# Patient Record
Sex: FEMALE | Race: BLACK OR AFRICAN AMERICAN | Employment: UNEMPLOYED | URBAN - METROPOLITAN AREA
[De-identification: names, ages, dates, MRNs, and addresses within clinical notes are randomized per-mention and may not be internally consistent; named-entity substitution may affect disease eponyms.]

---

## 2017-02-11 ENCOUNTER — APPOINTMENT (OUTPATIENT)
Dept: GENERAL RADIOLOGY | Facility: HOSPITAL | Age: 52
End: 2017-02-11
Attending: EMERGENCY MEDICINE
Payer: MEDICAID

## 2017-02-11 ENCOUNTER — HOSPITAL ENCOUNTER (EMERGENCY)
Facility: HOSPITAL | Age: 52
Discharge: HOME | End: 2017-02-11
Attending: EMERGENCY MEDICINE
Payer: MEDICAID

## 2017-02-11 VITALS
SYSTOLIC BLOOD PRESSURE: 103 MMHG | TEMPERATURE: 99 F | DIASTOLIC BLOOD PRESSURE: 66 MMHG | OXYGEN SATURATION: 100 % | WEIGHT: 169 LBS | HEART RATE: 74 BPM | HEIGHT: 62 IN | RESPIRATION RATE: 16 BRPM | BODY MASS INDEX: 31.1 KG/M2

## 2017-02-11 DIAGNOSIS — M54.50 LOW BACK PAIN AT MULTIPLE SITES: Primary | ICD-10-CM

## 2017-02-11 LAB
B-HCG UR QL: NEGATIVE
BILIRUB UR QL: NEGATIVE
CLARITY UR: CLEAR
COLOR UR: YELLOW
GLUCOSE UR-MCNC: NEGATIVE MG/DL
KETONES UR-MCNC: NEGATIVE MG/DL
LEUKOCYTE ESTERASE UR QL STRIP.AUTO: NEGATIVE
NITRITE UR QL STRIP.AUTO: NEGATIVE
PH UR: 5 [PH] (ref 5–8)
PROT UR-MCNC: NEGATIVE MG/DL
RBC #/AREA URNS AUTO: 3 /HPF
SP GR UR STRIP: 1.01 (ref 1–1.03)
UROBILINOGEN UR STRIP-ACNC: <2
VIT C UR-MCNC: NEGATIVE MG/DL
WBC #/AREA URNS AUTO: 2 /HPF

## 2017-02-11 PROCEDURE — 71020 XR CHEST PA + LAT CHEST (CPT=71020): CPT

## 2017-02-11 PROCEDURE — 72100 X-RAY EXAM L-S SPINE 2/3 VWS: CPT

## 2017-02-11 PROCEDURE — 81025 URINE PREGNANCY TEST: CPT

## 2017-02-11 PROCEDURE — 99284 EMERGENCY DEPT VISIT MOD MDM: CPT

## 2017-02-11 PROCEDURE — 81001 URINALYSIS AUTO W/SCOPE: CPT | Performed by: EMERGENCY MEDICINE

## 2017-02-11 RX ORDER — IBUPROFEN 400 MG/1
400 TABLET ORAL EVERY 8 HOURS PRN
Qty: 20 TABLET | Refills: 0 | Status: SHIPPED | OUTPATIENT
Start: 2017-02-11 | End: 2017-02-16

## 2017-02-11 RX ORDER — IBUPROFEN 600 MG/1
600 TABLET ORAL ONCE
Status: COMPLETED | OUTPATIENT
Start: 2017-02-11 | End: 2017-02-11

## 2017-02-11 NOTE — ED PROVIDER NOTES
Patient Seen in: [facility] Emergency Department    History   Patient presents with:  Abdomen/Flank Pain (GI/)    Stated Complaint: c/o left sided flank pain radiating to left shoulder    HPI    Patient is a 16-year-old female that is a very poo and time. Appears well-developed and well-nourished. HEENT:   Head: Normocephalic and atraumatic.    Right Ear: External ear normal.   Left Ear: External ear normal.   Nose: Nose normal.   Mouth/Throat: Oropharynx is clear and moist.   Eyes: Conjunctivae spondylolysis.  Mild chronic degenerative changes lumbar spine             Disposition and Plan     Clinical Impression:  Low back pain at multiple sites  (primary encounter diagnosis)    Disposition:  Discharge    Follow-up:  No follow-up provider specifie

## 2017-06-09 ENCOUNTER — APPOINTMENT (OUTPATIENT)
Dept: GENERAL RADIOLOGY | Facility: HOSPITAL | Age: 52
End: 2017-06-09
Attending: NURSE PRACTITIONER
Payer: MEDICAID

## 2017-06-09 ENCOUNTER — HOSPITAL ENCOUNTER (EMERGENCY)
Facility: HOSPITAL | Age: 52
Discharge: HOME | End: 2017-06-09
Payer: MEDICAID

## 2017-06-09 VITALS
BODY MASS INDEX: 33.13 KG/M2 | SYSTOLIC BLOOD PRESSURE: 141 MMHG | HEART RATE: 66 BPM | TEMPERATURE: 99 F | WEIGHT: 180 LBS | OXYGEN SATURATION: 100 % | HEIGHT: 62 IN | DIASTOLIC BLOOD PRESSURE: 82 MMHG | RESPIRATION RATE: 16 BRPM

## 2017-06-09 DIAGNOSIS — S76.912A MUSCLE STRAIN OF LEFT THIGH, INITIAL ENCOUNTER: Primary | ICD-10-CM

## 2017-06-09 PROCEDURE — 99283 EMERGENCY DEPT VISIT LOW MDM: CPT

## 2017-06-09 PROCEDURE — 96372 THER/PROPH/DIAG INJ SC/IM: CPT

## 2017-06-09 PROCEDURE — 73552 X-RAY EXAM OF FEMUR 2/>: CPT | Performed by: NURSE PRACTITIONER

## 2017-06-09 RX ORDER — IBUPROFEN 600 MG/1
600 TABLET ORAL EVERY 8 HOURS PRN
Status: DISCONTINUED | OUTPATIENT
Start: 2017-06-09 | End: 2017-06-10

## 2017-06-09 RX ORDER — IBUPROFEN 600 MG/1
600 TABLET ORAL EVERY 8 HOURS PRN
Qty: 20 TABLET | Refills: 0 | Status: ON HOLD | OUTPATIENT
Start: 2017-06-09 | End: 2019-11-11

## 2017-06-09 RX ORDER — KETOROLAC TROMETHAMINE 30 MG/ML
60 INJECTION, SOLUTION INTRAMUSCULAR; INTRAVENOUS ONCE
Status: COMPLETED | OUTPATIENT
Start: 2017-06-09 | End: 2017-06-09

## 2017-06-09 RX ORDER — CYCLOBENZAPRINE HCL 10 MG
10 TABLET ORAL 3 TIMES DAILY PRN
Qty: 20 TABLET | Refills: 0 | Status: SHIPPED | OUTPATIENT
Start: 2017-06-09 | End: 2017-06-16

## 2017-06-10 NOTE — ED NOTES
Patient sleeping on cart- vitals WNL, updated on plan of care. At this time patient states her pain is 8/10. APN made aware. ,

## 2017-06-10 NOTE — ED PROVIDER NOTES
Patient Seen in: [facility] AND [facility] Emergency Department    History   Patient presents with:  Fall (musculoskeletal, neurologic)    Stated Complaint: fall    HPI    Patient complains of left thigh pain after running to  a baseball while playing wit bilateral  CARDIO: RRR without murmur  GI: abdomen is soft and non tender, no masses, nl bowel sounds   EXTREMITIES: from, 5/5 strength in all 4 ext, no edema   NEURO: alert and oiented *3, 2-12 intact, no focal deficit noted  SKIN: good skin turgor, no  r

## 2018-04-18 ENCOUNTER — HOSPITAL ENCOUNTER (EMERGENCY)
Facility: HOSPITAL | Age: 53
Discharge: HOME | End: 2018-04-18
Attending: EMERGENCY MEDICINE
Payer: MEDICAID

## 2018-04-18 VITALS
HEART RATE: 66 BPM | DIASTOLIC BLOOD PRESSURE: 75 MMHG | RESPIRATION RATE: 14 BRPM | TEMPERATURE: 98 F | HEIGHT: 62 IN | BODY MASS INDEX: 32.2 KG/M2 | WEIGHT: 175 LBS | SYSTOLIC BLOOD PRESSURE: 125 MMHG | OXYGEN SATURATION: 99 %

## 2018-04-18 DIAGNOSIS — K52.9 GASTROENTERITIS: Primary | ICD-10-CM

## 2018-04-18 PROCEDURE — 96360 HYDRATION IV INFUSION INIT: CPT

## 2018-04-18 PROCEDURE — 93010 ELECTROCARDIOGRAM REPORT: CPT | Performed by: EMERGENCY MEDICINE

## 2018-04-18 PROCEDURE — 80048 BASIC METABOLIC PNL TOTAL CA: CPT | Performed by: EMERGENCY MEDICINE

## 2018-04-18 PROCEDURE — 80076 HEPATIC FUNCTION PANEL: CPT | Performed by: EMERGENCY MEDICINE

## 2018-04-18 PROCEDURE — 99284 EMERGENCY DEPT VISIT MOD MDM: CPT

## 2018-04-18 PROCEDURE — 93005 ELECTROCARDIOGRAM TRACING: CPT

## 2018-04-18 PROCEDURE — 85025 COMPLETE CBC W/AUTO DIFF WBC: CPT | Performed by: EMERGENCY MEDICINE

## 2018-04-18 PROCEDURE — 81001 URINALYSIS AUTO W/SCOPE: CPT | Performed by: EMERGENCY MEDICINE

## 2018-04-18 PROCEDURE — 83690 ASSAY OF LIPASE: CPT | Performed by: EMERGENCY MEDICINE

## 2018-04-18 RX ORDER — ONDANSETRON 4 MG/1
4 TABLET, ORALLY DISINTEGRATING ORAL EVERY 6 HOURS PRN
Qty: 10 TABLET | Refills: 0 | Status: SHIPPED | OUTPATIENT
Start: 2018-04-18 | End: 2018-04-25

## 2018-04-18 RX ORDER — LEVOTHYROXINE SODIUM 0.15 MG/1
150 TABLET ORAL
Status: ON HOLD | COMMUNITY
End: 2019-11-12

## 2018-04-18 RX ORDER — ASPIRIN 325 MG
325 TABLET ORAL DAILY
COMMUNITY

## 2018-04-18 NOTE — ED PROVIDER NOTES
Patient Seen in: [facility] Emergency Department    History   Patient presents with:  Abdomen/Flank Pain (GI/)    Stated Complaint: abd pain and vomit     HPI    46 yo female with  Abdominal pain for the last 12 hours.  She did vomit, no diarrhea adenopathy. Neurological: She is alert and oriented to person, place, and time. No cranial nerve deficit. Skin: Skin is warm and dry. Psychiatric: She has a normal mood and affect. Her behavior is normal.   Nursing note and vitals reviewed. Medication List    START taking these medications    ondansetron 4 MG Oral Tablet Dispersible  Take 1 tablet (4 mg total) by mouth every 6 (six) hours as needed for Nausea.   Qty: 10 tablet Refills: 0

## 2018-05-23 ENCOUNTER — HOSPITAL ENCOUNTER (EMERGENCY)
Facility: HOSPITAL | Age: 53
Discharge: HOME | End: 2018-05-23
Attending: EMERGENCY MEDICINE
Payer: MEDICAID

## 2018-05-23 ENCOUNTER — APPOINTMENT (OUTPATIENT)
Dept: GENERAL RADIOLOGY | Facility: HOSPITAL | Age: 53
End: 2018-05-23
Attending: EMERGENCY MEDICINE
Payer: MEDICAID

## 2018-05-23 VITALS
RESPIRATION RATE: 18 BRPM | SYSTOLIC BLOOD PRESSURE: 132 MMHG | WEIGHT: 160 LBS | TEMPERATURE: 98 F | DIASTOLIC BLOOD PRESSURE: 60 MMHG | HEIGHT: 62 IN | OXYGEN SATURATION: 100 % | BODY MASS INDEX: 29.44 KG/M2 | HEART RATE: 68 BPM

## 2018-05-23 DIAGNOSIS — D22.9 BENIGN MOLE: ICD-10-CM

## 2018-05-23 DIAGNOSIS — S46.912A SHOULDER STRAIN, LEFT, INITIAL ENCOUNTER: Primary | ICD-10-CM

## 2018-05-23 PROCEDURE — 85025 COMPLETE CBC W/AUTO DIFF WBC: CPT | Performed by: EMERGENCY MEDICINE

## 2018-05-23 PROCEDURE — 71046 X-RAY EXAM CHEST 2 VIEWS: CPT | Performed by: EMERGENCY MEDICINE

## 2018-05-23 PROCEDURE — 93005 ELECTROCARDIOGRAM TRACING: CPT

## 2018-05-23 PROCEDURE — 99285 EMERGENCY DEPT VISIT HI MDM: CPT

## 2018-05-23 PROCEDURE — 93010 ELECTROCARDIOGRAM REPORT: CPT | Performed by: EMERGENCY MEDICINE

## 2018-05-23 PROCEDURE — 36415 COLL VENOUS BLD VENIPUNCTURE: CPT

## 2018-05-23 PROCEDURE — 80048 BASIC METABOLIC PNL TOTAL CA: CPT | Performed by: EMERGENCY MEDICINE

## 2018-05-23 PROCEDURE — 84484 ASSAY OF TROPONIN QUANT: CPT | Performed by: EMERGENCY MEDICINE

## 2018-05-23 RX ORDER — MELOXICAM 15 MG/1
15 TABLET ORAL DAILY
Qty: 15 TABLET | Refills: 0 | Status: SHIPPED | OUTPATIENT
Start: 2018-05-23 | End: 2018-06-07

## 2018-05-23 NOTE — ED PROVIDER NOTES
Patient Seen in: [facility] Emergency Department    History   No chief complaint on file. HPI    Patient presents to the ED complaining of left shoulder pain for the past several days.   She denies known injury, states pain is moderate, worse Pulmonary/Chest: Effort normal and breath sounds normal. No stridor. No respiratory distress. She has no wheezes. Abdominal: Soft. She exhibits no distension. Musculoskeletal: She exhibits tenderness. She exhibits no edema or deformity.    Tenderness in ED:    Preliminary Radiology Report  [facility], [facility]  [phone] - [address]    NAME: Nu Roth OF EXAM: 05/23/2018  Patient No:  [id number]  Physician:  Cyrstal Schreiber  YOB: 1965    Past mole    Disposition:  Discharge    Follow-up:  Benedict Aguilar   Rehab [address]    Schedule an appointment as soon as possible for a visit in 3 days      [address] [address]

## 2018-07-10 ENCOUNTER — HOSPITAL ENCOUNTER (EMERGENCY)
Facility: HOSPITAL | Age: 53
Discharge: HOME | End: 2018-07-10
Attending: EMERGENCY MEDICINE
Payer: COMMERCIAL

## 2018-07-10 VITALS
BODY MASS INDEX: 29.45 KG/M2 | TEMPERATURE: 98 F | DIASTOLIC BLOOD PRESSURE: 74 MMHG | OXYGEN SATURATION: 100 % | RESPIRATION RATE: 14 BRPM | HEART RATE: 50 BPM | HEIGHT: 62 IN | SYSTOLIC BLOOD PRESSURE: 129 MMHG | WEIGHT: 160.06 LBS

## 2018-07-10 DIAGNOSIS — S39.012A BACK STRAIN, INITIAL ENCOUNTER: Primary | ICD-10-CM

## 2018-07-10 PROCEDURE — 99282 EMERGENCY DEPT VISIT SF MDM: CPT
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
## 2019-11-11 ENCOUNTER — APPOINTMENT (OUTPATIENT)
Dept: CT IMAGING | Facility: HOSPITAL | Age: 54
End: 2019-11-11
Attending: EMERGENCY MEDICINE

## 2019-11-11 ENCOUNTER — APPOINTMENT (OUTPATIENT)
Dept: MRI IMAGING | Facility: HOSPITAL | Age: 54
End: 2019-11-11
Attending: Other

## 2019-11-11 ENCOUNTER — APPOINTMENT (OUTPATIENT)
Dept: CV DIAGNOSTICS | Facility: HOSPITAL | Age: 54
End: 2019-11-11
Attending: Other

## 2019-11-11 ENCOUNTER — HOSPITAL ENCOUNTER (OUTPATIENT)
Facility: HOSPITAL | Age: 54
Setting detail: OBSERVATION
Discharge: HOME | End: 2019-11-12
Attending: EMERGENCY MEDICINE | Admitting: HOSPITALIST

## 2019-11-11 DIAGNOSIS — R51.9 NONINTRACTABLE HEADACHE, UNSPECIFIED CHRONICITY PATTERN, UNSPECIFIED HEADACHE TYPE: ICD-10-CM

## 2019-11-11 DIAGNOSIS — R07.89 CHEST PAIN, ATYPICAL: ICD-10-CM

## 2019-11-11 DIAGNOSIS — R20.2 PARESTHESIA OF LEFT LEG: Primary | ICD-10-CM

## 2019-11-11 PROCEDURE — 93306 TTE W/DOPPLER COMPLETE: CPT | Performed by: OTHER

## 2019-11-11 PROCEDURE — 70450 CT HEAD/BRAIN W/O DYE: CPT | Performed by: EMERGENCY MEDICINE

## 2019-11-11 PROCEDURE — 99244 OFF/OP CNSLTJ NEW/EST MOD 40: CPT | Performed by: OTHER

## 2019-11-11 PROCEDURE — 71275 CT ANGIOGRAPHY CHEST: CPT | Performed by: EMERGENCY MEDICINE

## 2019-11-11 PROCEDURE — 70496 CT ANGIOGRAPHY HEAD: CPT | Performed by: EMERGENCY MEDICINE

## 2019-11-11 PROCEDURE — 70551 MRI BRAIN STEM W/O DYE: CPT | Performed by: OTHER

## 2019-11-11 PROCEDURE — 70498 CT ANGIOGRAPHY NECK: CPT | Performed by: EMERGENCY MEDICINE

## 2019-11-11 RX ORDER — MAGNESIUM HYDROXIDE/ALUMINUM HYDROXICE/SIMETHICONE 120; 1200; 1200 MG/30ML; MG/30ML; MG/30ML
30 SUSPENSION ORAL 4 TIMES DAILY PRN
Status: DISCONTINUED | OUTPATIENT
Start: 2019-11-11 | End: 2019-11-12

## 2019-11-11 RX ORDER — ASPIRIN 325 MG
325 TABLET ORAL DAILY
Status: DISCONTINUED | OUTPATIENT
Start: 2019-11-12 | End: 2019-11-12

## 2019-11-11 RX ORDER — LEVOTHYROXINE SODIUM 0.15 MG/1
150 TABLET ORAL
Status: DISCONTINUED | OUTPATIENT
Start: 2019-11-11 | End: 2019-11-12

## 2019-11-11 RX ORDER — ASPIRIN 325 MG
325 TABLET ORAL DAILY
Status: DISCONTINUED | OUTPATIENT
Start: 2019-11-11 | End: 2019-11-11

## 2019-11-11 RX ORDER — BUTALBITAL, ASPIRIN, AND CAFFEINE 50; 325; 40 MG/1; MG/1; MG/1
1 CAPSULE ORAL EVERY 4 HOURS PRN
Status: DISCONTINUED | OUTPATIENT
Start: 2019-11-11 | End: 2019-11-11

## 2019-11-11 RX ORDER — KETOROLAC TROMETHAMINE 30 MG/ML
15 INJECTION, SOLUTION INTRAMUSCULAR; INTRAVENOUS EVERY 6 HOURS PRN
Status: DISCONTINUED | OUTPATIENT
Start: 2019-11-11 | End: 2019-11-12

## 2019-11-11 RX ORDER — BUTALBITAL, ACETAMINOPHEN AND CAFFEINE 50; 325; 40 MG/1; MG/1; MG/1
1 TABLET ORAL EVERY 4 HOURS PRN
Status: DISCONTINUED | OUTPATIENT
Start: 2019-11-11 | End: 2019-11-12

## 2019-11-11 RX ORDER — TRAMADOL HYDROCHLORIDE 50 MG/1
50 TABLET ORAL EVERY 6 HOURS PRN
Status: DISCONTINUED | OUTPATIENT
Start: 2019-11-11 | End: 2019-11-11

## 2019-11-11 RX ORDER — HEPARIN SODIUM 5000 [USP'U]/ML
5000 INJECTION, SOLUTION INTRAVENOUS; SUBCUTANEOUS EVERY 8 HOURS SCHEDULED
Status: DISCONTINUED | OUTPATIENT
Start: 2019-11-11 | End: 2019-11-12

## 2019-11-11 RX ORDER — ONDANSETRON 2 MG/ML
4 INJECTION INTRAMUSCULAR; INTRAVENOUS EVERY 6 HOURS PRN
Status: DISCONTINUED | OUTPATIENT
Start: 2019-11-11 | End: 2019-11-12

## 2019-11-11 RX ORDER — CYCLOBENZAPRINE HCL 5 MG
10 TABLET ORAL 3 TIMES DAILY PRN
Status: DISCONTINUED | OUTPATIENT
Start: 2019-11-11 | End: 2019-11-12

## 2019-11-11 RX ORDER — ACETAMINOPHEN 325 MG/1
650 TABLET ORAL EVERY 6 HOURS PRN
Status: DISCONTINUED | OUTPATIENT
Start: 2019-11-11 | End: 2019-11-12

## 2019-11-11 RX ORDER — ASPIRIN 300 MG
300 SUPPOSITORY, RECTAL RECTAL DAILY
Status: DISCONTINUED | OUTPATIENT
Start: 2019-11-11 | End: 2019-11-11

## 2019-11-11 NOTE — PHYSICAL THERAPY NOTE
PHYSICAL THERAPY EVALUATION - INPATIENT     Room Number: 497/125-Q  Evaluation Date: 11/11/2019  Type of Evaluation: Initial   Physician Order: PT Eval and Treat    Presenting Problem: L leg paresthesia, headache, chest pain  Reason for Therapy: Mobility w/ horizontal and vertical head turns without affecting her balance. The patient's Approx Degree of Impairment: 35.83% has been calculated based on documentation in the [facility] '6 clicks' Inpatient Basic Mobility Short Form.   Research supports that vane pattern, unspecified headache type      Past Medical History  Past Medical History:   Diagnosis Date   • Anemia    • Stroke [facility])    • Thyroid disease        Past Surgical History  No past surgical history on file.     HOME SITUATION  Type of Home: House etc.): A Little   -   Moving from lying on back to sitting on the side of the bed?: None   How much help from another person does the patient currently need. ..   -   Moving to and from a bed to a chair (including a wheelchair)?: A Little   -   Need to walk

## 2019-11-11 NOTE — ED PROVIDER NOTES
Patient Seen in: [facility] AND [facility] Emergency Department    History   No chief complaint on file.     Stated Complaint: headache and left sided weakness     HPI    64-year-old female with past medical history of CVA without residual deficits, hypothyroid pre 100 %   O2 Device 11/11/19 0326 None (Room air)       Current:/82 (BP Location: Left arm)   Pulse 60   Temp 97.5 °F (36.4 °C) (Oral)   Resp 20   Ht 157.5 cm (5' 2\")   Wt 78.2 kg   SpO2 99%   BMI 31.55 kg/m²         Physical Exam   Constitutional: No Radiology, [facility] [address]  [phone] - Phone    [id number]    NAME: Jose Roberto Maurice OF EXAM: 11/11/2019  Patient No:  [id number]  Physician:  Yazmin Jackson  YOB: 1965    Past Medical History (entered by Technologist):    Re edema  No pleural effusion or pneumothorax    Enlarged heterogeneous right lobe of thyroid which is nonspecific      Case discussed with  Dr. Adrian Haq at 3:05 Cas Kong M.D.   This report has been electronically signed and verified by the Radiolog

## 2019-11-11 NOTE — PLAN OF CARE
Pt received awake and alert but sleepy. Continues to c/o headache and lower back pain. Q4H neuro checks. Echo done. V/S WNL. To be seen by PT/OT/ST. Encouraged to call for assistance.      Problem: SAFETY ADULT - FALL  Goal: Free from fall injury  Descripti

## 2019-11-11 NOTE — SLP NOTE
Received stroke protocol orders. Attempted to see patient, however, she was off floor for ECHO. Will attempt later today or tomorrow.   Nidhi Swartz MA/[facility]-SLP  Speech Language Pathologist  [place]. [phone]

## 2019-11-11 NOTE — CONSULTS
Neurology Inpatient Consult Note    Laurie Davalos : 1965   Referring Physician: Dr. Suellen Buckner  HPI:     Laurie Davalos is a 47year old female who is being seen in neurologic evaluation for headache, left-sided numbness.     Patient presented auscultation bilaterally  Neuro:  Mental Status: alert, speech fluent and appropriate       Cranial Nerves: pupils equal, round, and reactive to light; extraocular movements intact; facial sensation diminished to pinprick on left V1-3, face symmetric, hear BILATERALLY.       MRA brain, neck 2005  IMPRESSION:  THERE IS A FOCUS OF SIGNAL ALONG THE DISTAL RIGHT   INTERNAL CAROTID ARTERY.  THE APPEARANCE IS SOMEWHAT ATYPICAL BUT THE   POSSIBILITY THAT THIS FINDING DOES REPRESENT A SMALL ANEURYSM OF THE   [address] [facility]  [phone]

## 2019-11-11 NOTE — H&P
[facility] Hospitalist H&P       CC: Patient presents with:  Stroke (neurologic)       PCP: ESTEBAN STEVENS    History of Present Illness:  Pt is a 47year old female with PMHx prior CVA without residual deficit, thyroid disease.  She presented to the ED wit (78.2 kg)  07/10/18 : 160 lb 0.9 oz (72.6 kg)  05/23/18 : 160 lb (72.6 kg)  04/18/18 : 175 lb (79.4 kg)  06/09/17 : 180 lb (81.6 kg)  02/11/17 : 169 lb (76.7 kg)      Gen: alert, oriented, NAD  HEENT: NCAT, EOMI, MMM  Pulm: CTAB, normal respiratory effort VASCULATURE: There is adequate opacification of the pulmonary arterial tree. No suspicious filling defects are identified in the main, lobar, segmental, or proximal subsegmental pulmonary artery branches to suggest acute pulmonary embolism.  The distal subs as above. A preliminary report was issued by the [address] [place] [address] Radiology teleradiology service. There are no major discrepancies.    Dictated by (CST): Jodi Castillo MD on 11/11/2019 at 7:20     Approved by (CST): Jodi Castillo MD on 11/11/2019 at 7:25 called immediately to the Emergency Department and discussed with the patient's ER physician. A preliminary report was issued by the [address] [place] [address] Radiology teleradiology service. There are no major discrepancies.    Dictated by (CST): Ananda Andres MD on 58 significant stenosis. POSTERIOR CEREBRALS: Both PCAs appear to arise from the posterior communicating arteries. There appear to be hypoplastic or absent P1 segments bilaterally. Flow identified. No significant stenosis. ANEURYSMS: None.  VASCULAR Joy How ASA  - PT/OT    # Precordial pressure  - doubt cardiac in etiology given symptoms present on my eval yet EKG negative, trop negative x 2  - CTA chest w/o dissection, PE, or other acute process  - possible stress test prior to d/c - will need to be w/o caff

## 2019-11-11 NOTE — CM/SW NOTE
Received MDO for [facility] assessment - pt currently has no insurance. SW contacted Pastor Nathanael torres/ financial counseling and requested they see the pt today about insurance coverage. LATA/CM to remain available for support and/or discharge planning.        Ari Salcido

## 2019-11-11 NOTE — ED INITIAL ASSESSMENT (HPI)
Left sided weakness and HA x 1 hr; LKW 7990. HX STROKE. MD AT BEDSIDE.  NIH 1. 1 ASA 81mg taken today

## 2019-11-11 NOTE — PLAN OF CARE
Problem: PAIN - ADULT  Goal: Verbalizes/displays adequate comfort level or patient's stated pain goal  Description  INTERVENTIONS:  - Encourage pt to monitor pain and request assistance  - Assess pain using appropriate pain scale  - Administer analgesics assess for edema, trend weights  Outcome: Progressing     Problem: NEUROLOGICAL - ADULT  Goal: Achieves stable or improved neurological status  Description  INTERVENTIONS  - Assess for and report changes in neurological status  - Initiate measures to preve

## 2019-11-12 ENCOUNTER — APPOINTMENT (OUTPATIENT)
Dept: CV DIAGNOSTICS | Facility: HOSPITAL | Age: 54
End: 2019-11-12
Attending: INTERNAL MEDICINE

## 2019-11-12 ENCOUNTER — APPOINTMENT (OUTPATIENT)
Dept: NUCLEAR MEDICINE | Facility: HOSPITAL | Age: 54
End: 2019-11-12
Attending: INTERNAL MEDICINE

## 2019-11-12 VITALS
DIASTOLIC BLOOD PRESSURE: 86 MMHG | HEART RATE: 71 BPM | WEIGHT: 176.5 LBS | TEMPERATURE: 98 F | SYSTOLIC BLOOD PRESSURE: 127 MMHG | RESPIRATION RATE: 18 BRPM | HEIGHT: 62 IN | OXYGEN SATURATION: 100 % | BODY MASS INDEX: 32.48 KG/M2

## 2019-11-12 PROCEDURE — 78452 HT MUSCLE IMAGE SPECT MULT: CPT | Performed by: INTERNAL MEDICINE

## 2019-11-12 PROCEDURE — 93017 CV STRESS TEST TRACING ONLY: CPT | Performed by: INTERNAL MEDICINE

## 2019-11-12 PROCEDURE — 99225 SUBSEQUENT OBSERVATION CARE: CPT | Performed by: OTHER

## 2019-11-12 RX ORDER — ATORVASTATIN CALCIUM 40 MG/1
80 TABLET, FILM COATED ORAL NIGHTLY
Status: DISCONTINUED | OUTPATIENT
Start: 2019-11-12 | End: 2019-11-12

## 2019-11-12 RX ORDER — ATORVASTATIN CALCIUM 80 MG/1
80 TABLET, FILM COATED ORAL NIGHTLY
Qty: 30 TABLET | Refills: 5 | Status: SHIPPED | OUTPATIENT
Start: 2019-11-12

## 2019-11-12 RX ORDER — LEVOTHYROXINE SODIUM 175 UG/1
175 TABLET ORAL
Qty: 60 TABLET | Refills: 2 | Status: SHIPPED | OUTPATIENT
Start: 2019-11-13

## 2019-11-12 NOTE — PLAN OF CARE
Pt is alert and oriented. Daughter is present at the bedside. ST came back negative and she has been cleared for discharge. Pt has no complaints of pain at this time and is resting comfortably. Neuro checks were performed as directed.      Problem: PAIN - A optimize hemodynamic stability  - Monitor arterial and/or venous puncture sites for bleeding and/or hematoma  - Assess quality of pulses, skin color and temperature  - Assess for signs of decreased coronary artery perfusion - ex.  Angina  - Evaluate fluid b

## 2019-11-12 NOTE — PROGRESS NOTES
Neurology Inpatient Follow-up Note      HPI:     Patient being seen in follow up. Interval notes and workup reviewed. Feeling a little bit better. Some persistent symptoms.       Past Medical Hisotory:  Reviewed    Medications:  Reviewed    Allergies: 5. 9    ASSESSMENT/PLAN:   Left-sided numbness  History of ischemic stroke, adjacent to left lateral ventricle  No evidence of stroke on MRI brain.     Symptoms may be related to complicated migraine.     –I discussed my clinical impressions, recommendations

## 2019-11-12 NOTE — PHYSICAL THERAPY NOTE
PHYSICAL THERAPY TREATMENT NOTE - INPATIENT     Room Number: 866/577-F       Presenting Problem: L leg paresthesia, headache, chest pain    Problem List  Principal Problem:    Paresthesia of left leg  Active Problems:    Chest pain, atypical    Nonintracta hospital room?: A Little   -   Climbing 3-5 steps with a railing?: A Little     AM-PAC Score:  Raw Score: 20   Approx Degree of Impairment: 35.83%   Standardized Score (AM-PAC Scale): 47.67   CMS Modifier (G-Code): CJ    FUNCTIONAL ABILITY STATUS  Gait Ass

## 2019-11-12 NOTE — PROGRESS NOTES
VSS. Discharge education was done, discharge instructions given. Answered all her questions about her new medications. Awaiting her ride.

## 2019-11-12 NOTE — PROGRESS NOTES
[facility] Hospitalist Progress Note     Paulriki Leonel Patient Status:  Observation    1965 MRN [id number]   Location [facility] 3W/SW Attending Claribel Rocha MD   Hosp Day # 0 PCP ESTEBAN Yung     CC: follow up    SUBJECTIVE:  Up in type infarcts considered less likely. 2. No restricted diffusion or acute infarction. 3. Mild chronic white matter microvascular ischemic changes in the bilateral parietal regions.     Dictated by (CST): Natalia Rodney MD on 11/11/2019 at 16:37     Ap pt/family, coordination of care, and/or d/w staff.   Radha Murry MD     [facility] IM Hospitalist  Pager: [phone]

## 2019-11-12 NOTE — OCCUPATIONAL THERAPY NOTE
OCCUPATIONAL THERAPY EVALUATION - INPATIENT     Room Number: 080/227-E  Evaluation Date: 11/12/2019  Type of Evaluation: Initial  Presenting Problem: (LUE & LLE parathesia; headache, chest pain )    Physician Order: IP Consult to Occupational Therapy  Re THERAPY MEDICAL/SOCIAL HISTORY     Problem List  Principal Problem:    Paresthesia of left leg  Active Problems:    Chest pain, atypical    Nonintractable headache, unspecified chronicity pattern, unspecified headache type      Past Medical History  Past M another person does the patient currently need…  -   Putting on and taking off regular lower body clothing?: None  -   Bathing (including washing, rinsing, drying)?: None  -   Toileting, which includes using toilet, bedpan or urinal? : None  -   Putting on

## 2020-06-25 ENCOUNTER — APPOINTMENT (OUTPATIENT)
Dept: CT IMAGING | Facility: HOSPITAL | Age: 55
End: 2020-06-25
Attending: EMERGENCY MEDICINE

## 2020-06-25 ENCOUNTER — HOSPITAL ENCOUNTER (EMERGENCY)
Facility: HOSPITAL | Age: 55
Discharge: HOME | End: 2020-06-25
Attending: EMERGENCY MEDICINE

## 2020-06-25 VITALS
OXYGEN SATURATION: 99 % | HEART RATE: 58 BPM | RESPIRATION RATE: 18 BRPM | HEIGHT: 62 IN | DIASTOLIC BLOOD PRESSURE: 96 MMHG | WEIGHT: 170 LBS | TEMPERATURE: 99 F | SYSTOLIC BLOOD PRESSURE: 169 MMHG | BODY MASS INDEX: 31.28 KG/M2

## 2020-06-25 DIAGNOSIS — R03.0 ELEVATED BLOOD PRESSURE READING: ICD-10-CM

## 2020-06-25 DIAGNOSIS — R51.9 ACUTE NONINTRACTABLE HEADACHE, UNSPECIFIED HEADACHE TYPE: Primary | ICD-10-CM

## 2020-06-25 PROCEDURE — 81001 URINALYSIS AUTO W/SCOPE: CPT | Performed by: EMERGENCY MEDICINE

## 2020-06-25 PROCEDURE — 96375 TX/PRO/DX INJ NEW DRUG ADDON: CPT

## 2020-06-25 PROCEDURE — 99284 EMERGENCY DEPT VISIT MOD MDM: CPT

## 2020-06-25 PROCEDURE — 80048 BASIC METABOLIC PNL TOTAL CA: CPT | Performed by: EMERGENCY MEDICINE

## 2020-06-25 PROCEDURE — 70450 CT HEAD/BRAIN W/O DYE: CPT | Performed by: EMERGENCY MEDICINE

## 2020-06-25 PROCEDURE — 96374 THER/PROPH/DIAG INJ IV PUSH: CPT

## 2020-06-25 RX ORDER — DEXAMETHASONE SODIUM PHOSPHATE 10 MG/ML
8 INJECTION, SOLUTION INTRAMUSCULAR; INTRAVENOUS ONCE
Status: COMPLETED | OUTPATIENT
Start: 2020-06-25 | End: 2020-06-25

## 2020-06-25 RX ORDER — ONDANSETRON 2 MG/ML
4 INJECTION INTRAMUSCULAR; INTRAVENOUS ONCE
Status: DISCONTINUED | OUTPATIENT
Start: 2020-06-25 | End: 2020-06-25

## 2020-06-25 RX ORDER — METOCLOPRAMIDE HYDROCHLORIDE 5 MG/ML
5 INJECTION INTRAMUSCULAR; INTRAVENOUS ONCE
Status: COMPLETED | OUTPATIENT
Start: 2020-06-25 | End: 2020-06-25

## 2020-06-25 RX ORDER — MORPHINE SULFATE 4 MG/ML
6 INJECTION, SOLUTION INTRAMUSCULAR; INTRAVENOUS ONCE
Status: DISCONTINUED | OUTPATIENT
Start: 2020-06-25 | End: 2020-06-25

## 2020-06-25 RX ORDER — AMLODIPINE BESYLATE 5 MG/1
5 TABLET ORAL DAILY
Qty: 14 TABLET | Refills: 0 | Status: SHIPPED | OUTPATIENT
Start: 2020-06-25 | End: 2020-06-25

## 2020-06-25 RX ORDER — AMLODIPINE BESYLATE 5 MG/1
5 TABLET ORAL DAILY
Status: DISCONTINUED | OUTPATIENT
Start: 2020-06-25 | End: 2020-06-25

## 2020-06-25 RX ORDER — HYDROCHLOROTHIAZIDE 25 MG/1
25 TABLET ORAL DAILY
Qty: 15 TABLET | Refills: 0 | Status: SHIPPED | OUTPATIENT
Start: 2020-06-25 | End: 2020-07-10

## 2020-06-25 RX ORDER — HYDROCHLOROTHIAZIDE 25 MG/1
25 TABLET ORAL DAILY
Status: DISCONTINUED | OUTPATIENT
Start: 2020-06-25 | End: 2020-06-25

## 2020-06-25 RX ORDER — DIPHENHYDRAMINE HYDROCHLORIDE 50 MG/ML
12.5 INJECTION INTRAMUSCULAR; INTRAVENOUS ONCE
Status: COMPLETED | OUTPATIENT
Start: 2020-06-25 | End: 2020-06-25

## 2020-06-25 RX ORDER — MORPHINE SULFATE 4 MG/ML
4 INJECTION, SOLUTION INTRAMUSCULAR; INTRAVENOUS ONCE
Status: COMPLETED | OUTPATIENT
Start: 2020-06-25 | End: 2020-06-25

## 2020-06-25 NOTE — ED NOTES
Care endorsed to me by Jose Cedillo RN. CT called - ready for patient plain CT head. Transported to CT by this RN. Patient then transported back to room 22. Patient asleep - resting comfortably on ED cart. Pt daughter at the bedside.

## 2020-06-25 NOTE — ED PROVIDER NOTES
Patient Seen in: [facility] AND [facility] Emergency Department      History   Patient presents with:  Headache  Vomiting    Stated Complaint:     HPI     52-year-old female with a history of reported stroke in 2005 with stroke work-up here in November 2018 incl and intact distal pulses. Pulmonary/Chest: Effort normal. No respiratory distress. Abdominal: Soft. There is no tenderness. There is no guarding. Musculoskeletal: Normal range of motion. No edema or tenderness.    Neurological: Alert and oriented to worsening or change.               Disposition and Plan     Clinical Impression:  Acute nonintractable headache, unspecified headache type  (primary encounter diagnosis)  Elevated blood pressure reading    Disposition:  Discharge  6/25/2020  3:41 am    Foll

## 2020-10-10 NOTE — ED INITIAL ASSESSMENT (HPI)
Frontal headache with multiple episodes of n/v. Symptoms began two hours ago. Not relieved by Advil. Patient denies fever. No head injury. DISPLAY PLAN FREE TEXT

## 2021-07-31 ENCOUNTER — HOSPITAL ENCOUNTER (EMERGENCY)
Facility: HOSPITAL | Age: 56
Discharge: HOME | End: 2021-07-31
Attending: EMERGENCY MEDICINE

## 2021-07-31 ENCOUNTER — APPOINTMENT (OUTPATIENT)
Dept: GENERAL RADIOLOGY | Facility: HOSPITAL | Age: 56
End: 2021-07-31
Attending: EMERGENCY MEDICINE

## 2021-07-31 VITALS
HEIGHT: 62 IN | DIASTOLIC BLOOD PRESSURE: 90 MMHG | WEIGHT: 180 LBS | SYSTOLIC BLOOD PRESSURE: 146 MMHG | BODY MASS INDEX: 33.13 KG/M2 | TEMPERATURE: 98 F | RESPIRATION RATE: 19 BRPM | OXYGEN SATURATION: 98 % | HEART RATE: 60 BPM

## 2021-07-31 DIAGNOSIS — J02.9 VIRAL PHARYNGITIS: Primary | ICD-10-CM

## 2021-07-31 LAB — S PYO AG THROAT QL: NEGATIVE

## 2021-07-31 PROCEDURE — 70360 X-RAY EXAM OF NECK: CPT | Performed by: EMERGENCY MEDICINE

## 2021-07-31 PROCEDURE — 87880 STREP A ASSAY W/OPTIC: CPT

## 2021-07-31 PROCEDURE — 99283 EMERGENCY DEPT VISIT LOW MDM: CPT

## 2021-07-31 RX ORDER — LIDOCAINE HYDROCHLORIDE 20 MG/ML
5 SOLUTION OROPHARYNGEAL
Qty: 20 ML | Refills: 0 | Status: SHIPPED | OUTPATIENT
Start: 2021-07-31

## 2021-07-31 RX ORDER — LIDOCAINE HYDROCHLORIDE 20 MG/ML
10 SOLUTION OROPHARYNGEAL ONCE
Status: COMPLETED | OUTPATIENT
Start: 2021-07-31 | End: 2021-07-31

## 2021-07-31 RX ORDER — BENZOCAINE/MENTH/CETYLPYRD CL 15 MG-2 MG
1 LOZENGE MUCOUS MEMBRANE 4 TIMES DAILY PRN
Qty: 168 LOZENGE | Refills: 0 | Status: SHIPPED | OUTPATIENT
Start: 2021-07-31 | End: 2021-08-14

## 2021-07-31 NOTE — ED INITIAL ASSESSMENT (HPI)
Pt c/o sore throat and running nose x 2 weeks. Pt states is very painful to swallow food. Denies fever or kimi.

## 2021-07-31 NOTE — ED PROVIDER NOTES
Patient Seen in: [facility] AND [facility] Emergency Department      History   Patient presents with:  Sore Throat    Stated Complaint: sor throat    HPI/Subjective:   HPI    20-year-old female with history of thyroid disease, previous CVA, anemia, here with com °C)   Temp src Oral   SpO2 98 %   O2 Device None (Room air)       Current:BP (!) 181/99   Pulse 79   Temp 98.3 °F (36.8 °C) (Oral)   Resp 18   Ht 157.5 cm (5' 2\")   Wt 81.6 kg   SpO2 98%   Breastfeeding No   BMI 32.92 kg/m²         Physical Exam  Vitals a above.    DD:  07/31/2021/DT:  07/31/2021      EMERGENCY DEPARTMENT COURSE AND TREATMENT:  Patient's condition was stable during Emergency Department evaluation.      55yoF with sore throat  - I personally reviewed and interpreted all the ED vitals  - afebr [phone]    Call in 2 days  As needed          Medications Prescribed:  Current Discharge Medication List    START taking these medications    Lidocaine Viscous HCl 2 % Mouth/Throat Solution  Take 5 mL by mouth every 3 (three) hours as need

## 2021-07-31 NOTE — ED QUICK NOTES
Pt provided and explained d/c instructions, at-home care, follow-up, and rx. Pt in nad at this time. No iv access. Vss. Ley. Ambulatory. A&ox3. Belongings with pt. All questions and concerns addressed.

## 2021-10-08 NOTE — ED AVS SNAPSHOT
A user error has taken place: encounter opened in error, closed for administrative reasons.     [facility] Emergency Department    [address]     [place] [address]    Phone:  [phone]    Fax:  [phone]   MRN: [id number]    Department:  [facility] Emergency Department   Date of Visit:  6/9/2 and Class Registration line at [phone] or find a doctor online by visiting [email or web address].    IF THERE IS ANY CHANGE OR WORSENING OF YOUR CONDITION, CALL YOUR PRIMARY CARE PHYSICIAN AT ONCE OR RETURN IMMEDIATELY TO [address] [place] [address].     If

## 2023-09-12 NOTE — SLP NOTE
ADULT SWALLOWING EVALUATION    ASSESSMENT    ASSESSMENT/OVERALL IMPRESSION:  Patient presents with a functional swallow. Oral phase was within normal limits with adequate bilabial seal, timely mastication and transit and no oral retention.   The pharyngeal Spoke with pot earlier PM 9/11. Persistent elevated Hgb with neg JAK2. Has increasing SOB with exertion. ? If still primary marrow hemoglobinopathy with normal EPO  level vs secondary lung issue but neg CXR  with normal heart size and sats still low 90s with exertion but gets fast HR with exertion. No chest pain  with exertion . Will have pt see hematology but also see pulmonary for eval and probable PFTs with diffusion  (Since pt would have to drive from [place] for appt  with pulmonary, will have pt get PFTs through pulmonary order while at pulm office rather than two trips. If all negative with additional work-up, will then get cardiac stress testing. LAst done with stress ECHO 2020. If truly primary marrow issue, then would likely benefit from phlebotomy but hesitant to empirically due tow th further eval incase secondary Hgb elevation for which decreasing Hgb would worsen sx further. Will await Hematology and Pulmonary consultations. Pt give tele numbers that she may call 9/12/AM if nor wanting to wait for   to call her   Natural;Functional  Symmetry: Within Functional Limits  Strength:  Within Functional Limits  Tone: Within Functional Limits  Range of Motion: Within Functional Limits  Rate of Motion: Within Functional Limits    Voice Quality: Clear  Respiratory Status: Unl

## 2024-04-15 ENCOUNTER — HOSPITAL ENCOUNTER (EMERGENCY)
Facility: HOSPITAL | Age: 59
Discharge: HOME | End: 2024-04-15
Attending: EMERGENCY MEDICINE

## 2024-04-15 VITALS
OXYGEN SATURATION: 100 % | TEMPERATURE: 99 F | RESPIRATION RATE: 18 BRPM | DIASTOLIC BLOOD PRESSURE: 99 MMHG | SYSTOLIC BLOOD PRESSURE: 150 MMHG | HEART RATE: 81 BPM

## 2024-04-15 DIAGNOSIS — K04.7 DENTAL INFECTION: Primary | ICD-10-CM

## 2024-04-15 PROCEDURE — 99283 EMERGENCY DEPT VISIT LOW MDM: CPT

## 2024-04-15 PROCEDURE — 99284 EMERGENCY DEPT VISIT MOD MDM: CPT

## 2024-04-15 RX ORDER — IBUPROFEN 600 MG/1
600 TABLET ORAL ONCE
Status: COMPLETED | OUTPATIENT
Start: 2024-04-15 | End: 2024-04-15

## 2024-04-15 RX ORDER — DEXAMETHASONE 4 MG/1
8 TABLET ORAL ONCE
Status: COMPLETED | OUTPATIENT
Start: 2024-04-15 | End: 2024-04-15

## 2024-04-15 RX ORDER — AMOXICILLIN AND CLAVULANATE POTASSIUM 875; 125 MG/1; MG/1
1 TABLET, FILM COATED ORAL 2 TIMES DAILY
Qty: 20 TABLET | Refills: 0 | Status: SHIPPED | OUTPATIENT
Start: 2024-04-15 | End: 2024-04-22

## 2024-04-15 RX ORDER — TRAMADOL HYDROCHLORIDE 50 MG/1
TABLET ORAL EVERY 6 HOURS PRN
Qty: 10 TABLET | Refills: 0 | Status: SHIPPED | OUTPATIENT
Start: 2024-04-15 | End: 2024-04-20

## 2024-04-15 RX ORDER — NAPROXEN 500 MG/1
500 TABLET ORAL 2 TIMES DAILY PRN
Qty: 20 TABLET | Refills: 0 | Status: SHIPPED | OUTPATIENT
Start: 2024-04-15 | End: 2024-04-25

## 2024-04-15 RX ORDER — ACETAMINOPHEN 500 MG
1000 TABLET ORAL ONCE
Status: COMPLETED | OUTPATIENT
Start: 2024-04-15 | End: 2024-04-15

## 2024-04-15 NOTE — ED PROVIDER NOTES
Patient Seen in: [facility] Emergency Department    History     Chief Complaint   Patient presents with    Dental Problem     Stated Complaint: left sided dental pain and swelling    HPI    Cira Purcell is a 58 year old female who presents for evaluation and management of a chief complaint of dental pain. Onset few days ago with swelling and pain.   Patient describes pain as  8/10. Located l lower tooth and lip. The pain is worse with chewing.   Patient denies jaw swelling or fever.      Past Medical History:    Anemia    Stroke (HCC)    Thyroid disease       History reviewed. No pertinent surgical history.         No family history on file.    Social History     Socioeconomic History    Marital status: Single   Tobacco Use    Smoking status: Never    Smokeless tobacco: Never   Vaping Use    Vaping status: Never Used   Substance and Sexual Activity    Alcohol use: Never    Drug use: Never       Review of Systems    Positive for stated complaint: left sided dental pain and swelling  Other systems are as noted in HPI.  Constitutional and vital signs reviewed.      All other systems reviewed and negative except as noted above.    PSFH elements reviewed from today and agreed except as otherwise stated in HPI.    Physical Exam     ED Triage Vitals [04/15/24 0755]   BP (!) 150/99   Pulse 81   Resp 18   Temp 98.9 °F (37.2 °C)   Temp src Temporal   SpO2 100 %   O2 Device None (Room air)       Current:BP (!) 150/99   Pulse 81   Temp 98.9 °F (37.2 °C) (Temporal)   Resp 18   SpO2 100%   General Appearance: alert, no distress, no stridor  Eyes: pupils equal and round no pallor or injection  ENT, Mouth: mucous membranes moist, tooth 22 necrotic tender with sts no abscess   Neck: supple, no meningeal signs, no ant cervical adenopathy  Respiratory: no resp distress, no stridor,  Neurological:II-XII grossly intact,  no focal deficits  Skin: warm and dry, no rashes.  Musculoskeletal:  Extremities are symmetrical, full  range of motion        Physical Exam          ED Course   Labs Reviewed - No data to display    PROCEDURE:    MDM     Medical Decision Making  Problems Addressed:  Dental infection: acute illness or injury    Amount and/or Complexity of Data Reviewed  Discussion of management or test interpretation with external provider(s): Tylenol, motrin recommended.  Needs tooth extracted dental referral given    Risk  OTC drugs.  Prescription drug management.            Presentation consistent with dental pain. No evidence of David's Angina, large abscess pocket, requirement for emergent extraction, or other complications. Provided prescription for analgesics and antibiotics. Patient informed to follow up with local dentist. Return to ER if pain uncontrolled, abscess that drains purulent fluid, high fevers, trouble swallowing, or other concerns.    Disposition and Plan     Clinical Impression:  1. Dental infection        Disposition:  There is no disposition on file for this visit.    Follow-up:  Trena Nelson  [address] [place] [id number] [phone]    Follow up      [facility] [address] [id number] [phone]  Follow up        Medications Prescribed:  Current Discharge Medication List        START taking these medications    Details   amoxicillin clavulanate 875-125 MG Oral Tab Take 1 tablet by mouth 2 (two) times daily for 7 days.  Qty: 20 tablet, Refills: 0      naproxen 500 MG Oral Tab Take 1 tablet (500 mg total) by mouth 2 (two) times daily as needed.  Qty: 20 tablet, Refills: 0      traMADol 50 MG Oral Tab Take 1-2 tablets ( mg total) by mouth every 6 (six) hours as needed for Pain.  Qty: 10 tablet, Refills: 0    Associated Diagnoses: Dental infection

## (undated) NOTE — ED AVS SNAPSHOT
Senthil Davey   MRN: [id number]    Department:  [facility] Emergency Department   Date of Visit:  4/18/2018           Disclosure     Insurance plans vary and the physician(s) referred by the ER may not be covered by your plan.  Please contact y CARE PHYSICIAN AT ONCE OR RETURN IMMEDIATELY TO THE EMERGENCY DEPARTMENT. If you have been prescribed any medication(s), please fill your prescription right away and begin taking the medication(s) as directed.   If you believe that any of the medications

## (undated) NOTE — ED AVS SNAPSHOT
Jessica Umana   MRN: [id number]    Department:  [facility] Emergency Department   Date of Visit:  5/23/2018           Disclosure     Insurance plans vary and the physician(s) referred by the ER may not be covered by your plan.  Please contact y CARE PHYSICIAN AT ONCE OR RETURN IMMEDIATELY TO THE EMERGENCY DEPARTMENT. If you have been prescribed any medication(s), please fill your prescription right away and begin taking the medication(s) as directed.   If you believe that any of the medications

## (undated) NOTE — LETTER
Date & Time: 4/15/2024, 8:44 AM  Patient: Cira Purcell  Encounter Provider(s):    Jaiden Quezada MD       To Whom It May Concern:    Cira Purcell was seen and treated in our department on 4/15/2024. She is excused from work for 2 days.    If you have any questions or concerns, please do not hesitate to call.        _____________________________  Physician/APC Signature

## (undated) NOTE — ED AVS SNAPSHOT
[facility] Emergency Department    [address]     [place] [address]    Phone:  [phone]    Fax:  [phone]   MRN: [id number]    Department:  [facility] Emergency Department   Date of Visit:  2/11/ indicado, llame al encargado de prema mazariegos [phone]. It is our goal to assure that you are completely satisfied with every aspect of your visit today.   In an effort to constantly improve our service to you, we would appreciate any positive or negativ Any imaging studies and labs completed today can be reviewed in your MyChart account. You may have had testing done that requires us to contact you. Please make sure we have your correct phone number on file.       I certified that I have received a copy visit,  view other health information, and more. To sign up or find more information, go to [email or web address] and click on the Sign Up Now link in the [employer] box.      Enter your [employer] Activation Code exactly as it appears below along with yo

## (undated) NOTE — ED AVS SNAPSHOT
[facility] Emergency Department    [address]     [place] [address]    Phone:  [phone]    Fax:  [phone]   MRN: [id number]    Department:  [facility] Emergency Department   Date of Visit:  2/11/ and Class Registration line at [phone] or find a doctor online by visiting [email or web address].    IF THERE IS ANY CHANGE OR WORSENING OF YOUR CONDITION, CALL YOUR PRIMARY CARE PHYSICIAN AT ONCE OR RETURN IMMEDIATELY TO [address] [place] [address].     If

## (undated) NOTE — ED AVS SNAPSHOT
Sonja Hoffmann   MRN: [id number]    Department:  [facility] Emergency Department   Date of Visit:  7/10/2018           Disclosure     Insurance plans vary and the physician(s) referred by the ER may not be covered by your plan.  Please contact y CARE PHYSICIAN AT ONCE OR RETURN IMMEDIATELY TO THE EMERGENCY DEPARTMENT. If you have been prescribed any medication(s), please fill your prescription right away and begin taking the medication(s) as directed.   If you believe that any of the medications

## (undated) NOTE — ED AVS SNAPSHOT
[facility] Emergency Department    [address]     [place] [address]    Phone:  [phone]    Fax:  [phone]   MRN: [id number]    Department:  [facility] Emergency Department   Date of Visit:  6/9/2 Insurance plans vary and the physician(s) referred by the ER may not be covered by your plan. Please contact your insurance company to determine coverage and benefits available for follow-up care and referrals.       If you have difficulty scheduling your prescription right away and begin taking the medication(s) as directed.   If you believe that any of the medications or instructions on this list is different from what your Primary Care doctor has instructed you - please continue to take your medications a Patient [address] to help you get signed up for insurance coverage. Patient [address] is a Federal Navigator program that can help with your Affordable Care Act coverage, as well as all types of Medicaid plans.   To get signed up and covere